# Patient Record
Sex: FEMALE | Race: WHITE | NOT HISPANIC OR LATINO | URBAN - METROPOLITAN AREA
[De-identification: names, ages, dates, MRNs, and addresses within clinical notes are randomized per-mention and may not be internally consistent; named-entity substitution may affect disease eponyms.]

---
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
## 2020-03-06 ENCOUNTER — EMERGENCY (EMERGENCY)
Facility: HOSPITAL | Age: 20
LOS: 1 days | Discharge: HOME | End: 2020-03-06
Attending: EMERGENCY MEDICINE | Admitting: EMERGENCY MEDICINE
Payer: COMMERCIAL

## 2020-03-06 VITALS
RESPIRATION RATE: 18 BRPM | SYSTOLIC BLOOD PRESSURE: 132 MMHG | OXYGEN SATURATION: 98 % | TEMPERATURE: 99 F | DIASTOLIC BLOOD PRESSURE: 83 MMHG | HEART RATE: 73 BPM

## 2020-03-06 VITALS
TEMPERATURE: 98 F | OXYGEN SATURATION: 100 % | HEART RATE: 90 BPM | RESPIRATION RATE: 14 BRPM | SYSTOLIC BLOOD PRESSURE: 139 MMHG | DIASTOLIC BLOOD PRESSURE: 82 MMHG | WEIGHT: 190.04 LBS

## 2020-03-06 LAB
ALBUMIN SERPL ELPH-MCNC: 3.9 G/DL — SIGNIFICANT CHANGE UP (ref 3.3–5)
ALP SERPL-CCNC: 98 U/L — SIGNIFICANT CHANGE UP (ref 40–120)
ALT FLD-CCNC: 38 U/L — SIGNIFICANT CHANGE UP (ref 12–78)
ANION GAP SERPL CALC-SCNC: 9 MMOL/L — SIGNIFICANT CHANGE UP (ref 5–17)
APPEARANCE UR: CLEAR — SIGNIFICANT CHANGE UP
AST SERPL-CCNC: 34 U/L — SIGNIFICANT CHANGE UP (ref 15–37)
BACTERIA # UR AUTO: ABNORMAL
BASOPHILS # BLD AUTO: 0.01 K/UL — SIGNIFICANT CHANGE UP (ref 0–0.2)
BASOPHILS NFR BLD AUTO: 0.2 % — SIGNIFICANT CHANGE UP (ref 0–2)
BILIRUB SERPL-MCNC: 0.5 MG/DL — SIGNIFICANT CHANGE UP (ref 0.2–1.2)
BILIRUB UR-MCNC: NEGATIVE — SIGNIFICANT CHANGE UP
BUN SERPL-MCNC: 7 MG/DL — SIGNIFICANT CHANGE UP (ref 7–23)
CALCIUM SERPL-MCNC: 9 MG/DL — SIGNIFICANT CHANGE UP (ref 8.5–10.1)
CHLORIDE SERPL-SCNC: 104 MMOL/L — SIGNIFICANT CHANGE UP (ref 96–108)
CO2 SERPL-SCNC: 25 MMOL/L — SIGNIFICANT CHANGE UP (ref 22–31)
COLOR SPEC: SIGNIFICANT CHANGE UP
CREAT SERPL-MCNC: 0.72 MG/DL — SIGNIFICANT CHANGE UP (ref 0.5–1.3)
DIFF PNL FLD: NEGATIVE — SIGNIFICANT CHANGE UP
EOSINOPHIL # BLD AUTO: 0.11 K/UL — SIGNIFICANT CHANGE UP (ref 0–0.5)
EOSINOPHIL NFR BLD AUTO: 1.8 % — SIGNIFICANT CHANGE UP (ref 0–6)
EPI CELLS # UR: SIGNIFICANT CHANGE UP
FLU A RESULT: SIGNIFICANT CHANGE UP
FLU A RESULT: SIGNIFICANT CHANGE UP
FLUAV AG NPH QL: SIGNIFICANT CHANGE UP
FLUBV AG NPH QL: DETECTED
GLUCOSE SERPL-MCNC: 92 MG/DL — SIGNIFICANT CHANGE UP (ref 70–99)
GLUCOSE UR QL: NEGATIVE — SIGNIFICANT CHANGE UP
HCG SERPL-ACNC: <1 MIU/ML — SIGNIFICANT CHANGE UP
HCT VFR BLD CALC: 41.7 % — SIGNIFICANT CHANGE UP (ref 34.5–45)
HGB BLD-MCNC: 14.5 G/DL — SIGNIFICANT CHANGE UP (ref 11.5–15.5)
IMM GRANULOCYTES NFR BLD AUTO: 0.2 % — SIGNIFICANT CHANGE UP (ref 0–1.5)
KETONES UR-MCNC: ABNORMAL
LACTATE SERPL-SCNC: 1.6 MMOL/L — SIGNIFICANT CHANGE UP (ref 0.7–2)
LEUKOCYTE ESTERASE UR-ACNC: ABNORMAL
LYMPHOCYTES # BLD AUTO: 1.53 K/UL — SIGNIFICANT CHANGE UP (ref 1–3.3)
LYMPHOCYTES # BLD AUTO: 25.2 % — SIGNIFICANT CHANGE UP (ref 13–44)
MCHC RBC-ENTMCNC: 28.5 PG — SIGNIFICANT CHANGE UP (ref 27–34)
MCHC RBC-ENTMCNC: 34.8 GM/DL — SIGNIFICANT CHANGE UP (ref 32–36)
MCV RBC AUTO: 82.1 FL — SIGNIFICANT CHANGE UP (ref 80–100)
MONOCYTES # BLD AUTO: 0.47 K/UL — SIGNIFICANT CHANGE UP (ref 0–0.9)
MONOCYTES NFR BLD AUTO: 7.7 % — SIGNIFICANT CHANGE UP (ref 2–14)
NEUTROPHILS # BLD AUTO: 3.95 K/UL — SIGNIFICANT CHANGE UP (ref 1.8–7.4)
NEUTROPHILS NFR BLD AUTO: 64.9 % — SIGNIFICANT CHANGE UP (ref 43–77)
NITRITE UR-MCNC: NEGATIVE — SIGNIFICANT CHANGE UP
NRBC # BLD: 0 /100 WBCS — SIGNIFICANT CHANGE UP (ref 0–0)
PH UR: 8 — SIGNIFICANT CHANGE UP (ref 5–8)
PLATELET # BLD AUTO: 283 K/UL — SIGNIFICANT CHANGE UP (ref 150–400)
POTASSIUM SERPL-MCNC: 3.2 MMOL/L — LOW (ref 3.5–5.3)
POTASSIUM SERPL-SCNC: 3.2 MMOL/L — LOW (ref 3.5–5.3)
PROT SERPL-MCNC: 8.1 G/DL — SIGNIFICANT CHANGE UP (ref 6–8.3)
PROT UR-MCNC: NEGATIVE — SIGNIFICANT CHANGE UP
RBC # BLD: 5.08 M/UL — SIGNIFICANT CHANGE UP (ref 3.8–5.2)
RBC # FLD: 13.5 % — SIGNIFICANT CHANGE UP (ref 10.3–14.5)
RBC CASTS # UR COMP ASSIST: SIGNIFICANT CHANGE UP /HPF (ref 0–4)
RSV RESULT: SIGNIFICANT CHANGE UP
RSV RNA RESP QL NAA+PROBE: SIGNIFICANT CHANGE UP
SODIUM SERPL-SCNC: 138 MMOL/L — SIGNIFICANT CHANGE UP (ref 135–145)
SP GR SPEC: 1.01 — SIGNIFICANT CHANGE UP (ref 1.01–1.02)
UROBILINOGEN FLD QL: NEGATIVE — SIGNIFICANT CHANGE UP
WBC # BLD: 6.08 K/UL — SIGNIFICANT CHANGE UP (ref 3.8–10.5)
WBC # FLD AUTO: 6.08 K/UL — SIGNIFICANT CHANGE UP (ref 3.8–10.5)
WBC UR QL: SIGNIFICANT CHANGE UP

## 2020-03-06 PROCEDURE — 36415 COLL VENOUS BLD VENIPUNCTURE: CPT

## 2020-03-06 PROCEDURE — 85027 COMPLETE CBC AUTOMATED: CPT

## 2020-03-06 PROCEDURE — 83605 ASSAY OF LACTIC ACID: CPT

## 2020-03-06 PROCEDURE — 96375 TX/PRO/DX INJ NEW DRUG ADDON: CPT

## 2020-03-06 PROCEDURE — 99284 EMERGENCY DEPT VISIT MOD MDM: CPT | Mod: 25

## 2020-03-06 PROCEDURE — 87631 RESP VIRUS 3-5 TARGETS: CPT

## 2020-03-06 PROCEDURE — 87040 BLOOD CULTURE FOR BACTERIA: CPT

## 2020-03-06 PROCEDURE — 87633 RESP VIRUS 12-25 TARGETS: CPT

## 2020-03-06 PROCEDURE — 96374 THER/PROPH/DIAG INJ IV PUSH: CPT

## 2020-03-06 PROCEDURE — 96361 HYDRATE IV INFUSION ADD-ON: CPT

## 2020-03-06 PROCEDURE — 87798 DETECT AGENT NOS DNA AMP: CPT

## 2020-03-06 PROCEDURE — 84702 CHORIONIC GONADOTROPIN TEST: CPT

## 2020-03-06 PROCEDURE — 81001 URINALYSIS AUTO W/SCOPE: CPT

## 2020-03-06 PROCEDURE — 87486 CHLMYD PNEUM DNA AMP PROBE: CPT

## 2020-03-06 PROCEDURE — 80053 COMPREHEN METABOLIC PANEL: CPT

## 2020-03-06 PROCEDURE — 87086 URINE CULTURE/COLONY COUNT: CPT

## 2020-03-06 PROCEDURE — 99284 EMERGENCY DEPT VISIT MOD MDM: CPT

## 2020-03-06 PROCEDURE — 87581 M.PNEUMON DNA AMP PROBE: CPT

## 2020-03-06 RX ORDER — ONDANSETRON 8 MG/1
4 TABLET, FILM COATED ORAL ONCE
Refills: 0 | Status: COMPLETED | OUTPATIENT
Start: 2020-03-06 | End: 2020-03-06

## 2020-03-06 RX ORDER — FAMOTIDINE 10 MG/ML
20 INJECTION INTRAVENOUS ONCE
Refills: 0 | Status: COMPLETED | OUTPATIENT
Start: 2020-03-06 | End: 2020-03-06

## 2020-03-06 RX ORDER — SODIUM CHLORIDE 9 MG/ML
1000 INJECTION INTRAMUSCULAR; INTRAVENOUS; SUBCUTANEOUS ONCE
Refills: 0 | Status: COMPLETED | OUTPATIENT
Start: 2020-03-06 | End: 2020-03-06

## 2020-03-06 RX ORDER — ALPRAZOLAM 0.25 MG
0.25 TABLET ORAL ONCE
Refills: 0 | Status: DISCONTINUED | OUTPATIENT
Start: 2020-03-06 | End: 2020-03-06

## 2020-03-06 RX ORDER — ONDANSETRON 8 MG/1
1 TABLET, FILM COATED ORAL
Qty: 15 | Refills: 0
Start: 2020-03-06 | End: 2020-03-10

## 2020-03-06 RX ORDER — FLUOXETINE HCL 10 MG
40 CAPSULE ORAL ONCE
Refills: 0 | Status: COMPLETED | OUTPATIENT
Start: 2020-03-06 | End: 2020-03-06

## 2020-03-06 RX ADMIN — SODIUM CHLORIDE 1000 MILLILITER(S): 9 INJECTION INTRAMUSCULAR; INTRAVENOUS; SUBCUTANEOUS at 19:20

## 2020-03-06 RX ADMIN — ONDANSETRON 4 MILLIGRAM(S): 8 TABLET, FILM COATED ORAL at 18:56

## 2020-03-06 RX ADMIN — SODIUM CHLORIDE 1000 MILLILITER(S): 9 INJECTION INTRAMUSCULAR; INTRAVENOUS; SUBCUTANEOUS at 18:56

## 2020-03-06 RX ADMIN — Medication 0.25 MILLIGRAM(S): at 18:56

## 2020-03-06 RX ADMIN — SODIUM CHLORIDE 1000 MILLILITER(S): 9 INJECTION INTRAMUSCULAR; INTRAVENOUS; SUBCUTANEOUS at 18:20

## 2020-03-06 RX ADMIN — SODIUM CHLORIDE 1000 MILLILITER(S): 9 INJECTION INTRAMUSCULAR; INTRAVENOUS; SUBCUTANEOUS at 19:56

## 2020-03-06 RX ADMIN — FAMOTIDINE 20 MILLIGRAM(S): 10 INJECTION INTRAVENOUS at 18:56

## 2020-03-06 RX ADMIN — Medication 40 MILLIGRAM(S): at 18:56

## 2020-03-06 NOTE — ED ADULT NURSE NOTE - OBJECTIVE STATEMENT
a/ox4 patient came to ED c.o flu like symptoms. Patient anxious, c.o of decreased PO intake x1 week, vomiting, and dehydration. Patient states she's been so dizzy she can't hold anything down. Patient VSS. On droplet precautions for r.o flu. Will cont to monitor.

## 2020-03-06 NOTE — ED ADULT NURSE NOTE - CHIEF COMPLAINT QUOTE
C/O fever. + flu A. patient's mother was in cruise 7 days ago to [facility]. Mother tested negative for flu.

## 2020-03-06 NOTE — ED PROVIDER NOTE - CLINICAL SUMMARY MEDICAL DECISION MAKING FREE TEXT BOX
flu like symptoms, f/u labs, iv fluids, anti-emetics, patient did not take her prozac, to be given in ER

## 2020-03-06 NOTE — ED ADULT TRIAGE NOTE - CHIEF COMPLAINT QUOTE
C/O fever. + flu A. patient's mother was in cruise 7 days ago to [facility]. Mother tested negative for flu.

## 2020-03-06 NOTE — ED PROVIDER NOTE - NSFOLLOWUPINSTRUCTIONS_ED_ALL_ED_FT
WHAT YOU NEED TO KNOW:    Influenza (the flu) is an infection caused by the influenza virus. The flu is easily spread when an infected person coughs, sneezes, or has close contact with others. You may be able to spread the flu to others for 1 week or longer after signs or symptoms appear.    DISCHARGE INSTRUCTIONS:    Call your local emergency number (911 in the [place]) if:     You have trouble breathing, and your lips look purple or blue.      You have a seizure.    Return to the emergency department if:     You are dizzy, or you are urinating less or not at all.       You have a headache with a stiff neck, and you feel tired or confused.      You have new pain or pressure in your chest.      Your symptoms, such as shortness of breath, vomiting, or diarrhea, get worse.       Your symptoms, such as fever and coughing, seem to get better, but then get worse.     Call your doctor if:     You have new muscle pain or weakness.      You have questions or concerns about your condition or care.    Medicines: You may need any of the following:     Acetaminophen decreases pain and fever. It is available without a doctor's order. Ask how much to take and how often to take it. Follow directions. Read the labels of all other medicines you are using to see if they also contain acetaminophen, or ask your doctor or pharmacist. Acetaminophen can cause liver damage if not taken correctly. Do not use more than 4 grams (4,000 milligrams) total of acetaminophen in one day.       NSAIDs, such as ibuprofen, help decrease swelling, pain, and fever. This medicine is available with or without a doctor's order. NSAIDs can cause stomach bleeding or kidney problems in certain people. If you take blood thinner medicine, always ask your healthcare provider if NSAIDs are safe for you. Always read the medicine label and follow directions.      Antivirals help fight a viral infection.      Take your medicine as directed. Contact your healthcare provider if you think your medicine is not helping or if you have side effects. Tell him or her if you are allergic to any medicine. Keep a list of the medicines, vitamins, and herbs you take. Include the amounts, and when and why you take them. Bring the list or the pill bottles to follow-up visits. Carry your medicine list with you in case of an emergency.    Rest as much as you can to help you recover.    Drink liquids as directed to help prevent dehydration. Ask how much liquid to drink each day and which liquids are best for you.    Prevent the spread of influenza:     Wash your hands often. Use soap and water. Wash your hands after you use the bathroom, change a child's diapers, or sneeze. Wash your hands before you prepare or eat food. Use gel hand cleanser that has 60% alcohol, when soap and water are not available. Do not touch your eyes, nose, or mouth unless you have washed your hands first.Handwashing           Cover your mouth when you sneeze or cough. Cough into a tissue or the bend of your arm. If you use a tissue, throw it away immediately and wash your hands.       Clean shared items with a germ-killing . Clean table surfaces, doorknobs, and light switches. Do not share towels, silverware, and dishes with people who are sick. Wash bed sheets, towels, silverware, and dishes with soap and water.       Wear a mask over your mouth and nose if you are sick. The face mask may help protect others from becoming infected with the flu. Wear the mask when in common areas of your home or if you seek care with a healthcare provider.       Stay away from others if you are sick. Stay at home until 24 hours after your fever and symptoms are gone.      Influenza vaccine helps prevent influenza (flu). Everyone 6 months or older should get a yearly influenza vaccine. Get the vaccine as soon as recommended each year, usually in September or October.    Follow up with your doctor as directed: Write down your questions so you remember to ask them during your visits.       © Copyright [employer] 2020       back to top                      © Copyright [employer] 2020

## 2020-03-06 NOTE — ED PROVIDER NOTE - PATIENT PORTAL LINK FT
You can access the FollowMyHealth Patient Portal offered by [facility] by registering at the following website: [email or web address]. By joining [employer] FollowMyHealth portal, you will also be able to view your health information using other applications (apps) compatible with our system.

## 2020-03-06 NOTE — ED ADULT NURSE REASSESSMENT NOTE - NS ED NURSE REASSESS COMMENT FT1
Received report from Kira ZAVALETA. Patient resting in bed. No complaints of pain/discomfort at this time. Safety and comfort measures provided and maintained. Patient informed to provide urine.

## 2020-03-06 NOTE — ED PROVIDER NOTE - OBJECTIVE STATEMENT
19 female presents to ER with mother states on Monday she was diagnosed with FLU A at urgent care, having fever tmax 101F, body aches, sore throat, congestion, dizziness, vomiting. Patient was given tamiflu and stopped taking it in 2 days because of increased nausea. Patient mother at the bedside states she had flu like symptoms that are now resolving and other 17 year old daughter also has flu like symptoms. Patient has not had recent travel, but mother states she went on a cruise to [facility], no contact or exposure to corona virus.

## 2020-03-07 LAB
CULTURE RESULTS: SIGNIFICANT CHANGE UP
FLUBV RNA SPEC QL NAA+PROBE: DETECTED
RAPID RVP RESULT: DETECTED
SPECIMEN SOURCE: SIGNIFICANT CHANGE UP

## 2020-03-11 LAB
CULTURE RESULTS: SIGNIFICANT CHANGE UP
CULTURE RESULTS: SIGNIFICANT CHANGE UP
SPECIMEN SOURCE: SIGNIFICANT CHANGE UP
SPECIMEN SOURCE: SIGNIFICANT CHANGE UP

## 2021-01-13 ENCOUNTER — TRANSCRIPTION ENCOUNTER (OUTPATIENT)
Age: 21
End: 2021-01-13

## 2022-05-25 NOTE — ED PROVIDER NOTE - PRO INTERPRETER NEED 2
----- Message from Cindy Sanders sent at 5/18/2022 12:16 PM CDT -----  Contact: [phone]  Type:  Needs Medical Advice    Who Called: Cielo from [employer]   Would the patient rather a call back or a response via [facility]? Call back  Best Call Back Number: [phone]  Additional Information: Calling to ask if the medical records release form was received. Please call to advice        English

## 2023-04-04 NOTE — ED PROVIDER NOTE - NS ED MD DISPO DISCHARGE CCDA
Outpatient Psychiatry Follow-Up Visit    Clinical Status of Patient: Outpatient (Ambulatory)  04/04/2023     Chief Complaint: 29 year old male presenting today for a follow-up.       Interval History and Content of Current Session:  Interim Events/Subjective Report/Content of Current Session:  follow-up appointment.    Pt is a 29 year old male with past psychiatric hx of PTSD who presents for follow-up treatment. Pt explained that he is depressed and anxious with poor sleep. Pt described his depression symptoms as follows: poor sleep, poor appetite, daytime fatigue, crying, intrusive thoughts of being a burden, SI by hx, poor hygiene, isolate self, feels powerless. Pt notes a history of hitting himself. Pt denied SI lately. Pt denied any seizures and denied alcohol, drug, and cigarette use. Pt is not exercising.     Past Psychiatric hx: inpt X 1 in 2017 and prior treatment with Abilify, Trintellix, Wellbutrin, Zoloft (robotic), Buspar (effective), Prazosin (effective)    Past Medical hx:   Past Medical History:   Diagnosis Date    Anxiety     Depression     Tobacco use     Tricuspid insufficiency         Interim hx:  Medication changes last visit:   started Prozac 20mg once daily  Anxiety: moderate - unchanged  Depression: moderate - unchanged     Denies suicidal/homicidal ideations.  Denies hopelessness/worthlessness.    Denies auditory/visual hallucinations      Alcohol: minimal, infrequent  Drug: pt denied  Caffeine: minimal use  Tobacco: pt denied      Review of Systems   PSYCHIATRIC: Pertinent items are noted in the narrative.        CONSTITUTIONAL: weight stable    Past Medical, Family and Social History: The patient's past medical, family and social history have been reviewed and updated as appropriate within the electronic medical record. See encounter notes.     Current Psychiatric Medication:  Prozac 20mg one daily     Compliance: yes      Side effects: odd dreams and somewhat tired     Risk  Parameters:  Patient reports no suicidal ideation  Patient reports no homicidal ideation  Patient reports no self-injurious behavior  Patient reports no violent behavior     Exam (detailed: at least 9 elements; comprehensive: all 15 elements)   Constitutional  Vitals:  Most recent vital signs, dated less than 90 days prior to this appointment, were reviewed. Pulse:  [59]   BP: (130)/(89)       General:  unremarkable, age appropriate, casual attire, good eye contact, good rapport       Musculoskeletal  Muscle Strength/Tone:  no flaccidity, no tremor    Gait & Station:  normal      Psychiatric                       Speech:  normal tone, normal rate, rhythm, and volume   Mood & Affect:   Depressed, anxious         Thought Process:   Goal directed; Linear    Associations:   intact   Thought Content:   No SI/HI, delusions, or paranoia, no AV/VH   Insight & Judgement:   Good, adequate to circumstances   Orientation:   grossly intact; alert and oriented x 4    Memory:  intact for content of interview    Language:  grossly intact, can repeat    Attention Span  : Grossly intact for content of interview   Fund of Knowledge:   intact and appropriate to age and level of education        Assessment and Diagnosis   Status/Progress: unchanged     Impression: Pt struggles with depression and anxiety symptoms in the context of complex trauma. Pt should continue in psychiatric medication management, start counseling, and start a cardio exercise regimen.     Diagnosis: PTSD    Intervention/Counseling/Treatment Plan   Medication Management:      1. Continue Prozac 20mg once daily     2. Pt is on the waitlist for psychotherapy     3. Start a cardio exercise regimen     4. Call to report any worsening of symptoms or problems with the medication. Pt instructed to go to ER with thoughts of harming self, others    Psychotherapy:   Target symptoms: depression, anxiety  Why chosen therapy is appropriate versus another modality: CBT used;  relevant to diagnosis, patient responds to this modality  Outcome monitoring methods: self-report, observation  Therapeutic intervention type: Cognitive Behavioral Therapy  Topics discussed/themes: building skills sets for symptom management, symptom recognition, nutrition, exercise  The patient's response to the intervention is fair  Patient's response to treatment is: good.   The patient's progress toward treatment goals: unchanged  16 minutes spent with pt in psychotherapy and 5 minutes in medication management     Return to clinic: 5 weeks    -Cognitive-Behavioral/Supportive therapy and psychoeducation provided  -R/B/SE's of medications discussed with the pt who expresses understanding and chooses to take medications as prescribed.   -Pt instructed to call clinic, 911 or go to nearest emergency room if sxs worsen or pt is in   crisis. The pt expresses understanding.    Jose A Beaver, PhD, MP      Patient/Caregiver provided printed discharge information.

## 2023-12-07 ENCOUNTER — APPOINTMENT (OUTPATIENT)
Dept: MRI IMAGING | Facility: CLINIC | Age: 23
End: 2023-12-07
Payer: COMMERCIAL

## 2023-12-07 ENCOUNTER — APPOINTMENT (OUTPATIENT)
Dept: ORTHOPEDIC SURGERY | Facility: CLINIC | Age: 23
End: 2023-12-07
Payer: COMMERCIAL

## 2023-12-07 VITALS — HEIGHT: 63 IN | WEIGHT: 200 LBS | BODY MASS INDEX: 35.44 KG/M2

## 2023-12-07 DIAGNOSIS — Z86.59 PERSONAL HISTORY OF OTHER MENTAL AND BEHAVIORAL DISORDERS: ICD-10-CM

## 2023-12-07 PROBLEM — Z00.00 ENCOUNTER FOR PREVENTIVE HEALTH EXAMINATION: Status: ACTIVE | Noted: 2023-12-07

## 2023-12-07 PROCEDURE — 72148 MRI LUMBAR SPINE W/O DYE: CPT

## 2023-12-07 PROCEDURE — 72050 X-RAY EXAM NECK SPINE 4/5VWS: CPT

## 2023-12-07 PROCEDURE — 99204 OFFICE O/P NEW MOD 45 MIN: CPT

## 2023-12-07 PROCEDURE — 73502 X-RAY EXAM HIP UNI 2-3 VIEWS: CPT

## 2023-12-07 PROCEDURE — 72100 X-RAY EXAM L-S SPINE 2/3 VWS: CPT

## 2023-12-07 PROCEDURE — 72141 MRI NECK SPINE W/O DYE: CPT

## 2023-12-07 RX ORDER — CYCLOBENZAPRINE HYDROCHLORIDE 5 MG/1
5 TABLET, FILM COATED ORAL 3 TIMES DAILY
Qty: 30 | Refills: 0 | Status: ACTIVE | COMMUNITY
Start: 2023-12-07 | End: 1900-01-01

## 2023-12-07 RX ORDER — FLUOXETINE HCL 10 MG
10 TABLET ORAL
Refills: 0 | Status: ACTIVE | COMMUNITY

## 2023-12-09 ENCOUNTER — APPOINTMENT (OUTPATIENT)
Dept: MRI IMAGING | Facility: CLINIC | Age: 23
End: 2023-12-09
Payer: COMMERCIAL

## 2023-12-09 PROCEDURE — 73721 MRI JNT OF LWR EXTRE W/O DYE: CPT | Mod: LT

## 2023-12-22 ENCOUNTER — APPOINTMENT (OUTPATIENT)
Dept: ORTHOPEDIC SURGERY | Facility: CLINIC | Age: 23
End: 2023-12-22
Payer: COMMERCIAL

## 2023-12-22 PROCEDURE — 99213 OFFICE O/P EST LOW 20 MIN: CPT

## 2023-12-22 NOTE — IMAGING
[de-identified] : Spine: Inspection/Palpation: No tenderness to palpation throughout Cervical/thoracic/lumbar spine. No bony stepoffs, No lesions.   Gait: Non-antalgic, able to perform bilateral toe and heel rise.   Neurologic: Bilateral upper extremities 5/5 Deltoid/Biceps/Triceps/ Wrist Flexion/Wrist Extension/ / Intrinsics Except Bilateral Lower Extremities 5/5 Iliopsoas/Quadriceps/Hamstrings/ Tibialis Anterior/ Gastrocnemius. Extensor Hallucis Longus/ Flexor Hallucis Longus except   Sensation intact to light touch C5-T1 Sensation intact to light touch L2-S1   Biceps/Triceps/Brachioradialis Reflex within normal limits Patellar/ Achilles reflex within normal limits.   Negative Christiansen's,  No inverted brachioradialis reflex   Negative straight leg raise    Pain with left hip er  X-ray Ap/Lateral/ of cervical spine were viewed and interpreted.  Normal alignment is maintained without any spondylolisthesis.   X-ray Ap/Lateral of lumbar spine were viewed and interpreted.  Normal alignment is maintained without any spondylolisthesis.  x-ray ap pelvsi latearl bilateral hips.  No fractures
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
## 2024-02-21 ENCOUNTER — APPOINTMENT (OUTPATIENT)
Dept: ORTHOPEDIC SURGERY | Facility: CLINIC | Age: 24
End: 2024-02-21
Payer: COMMERCIAL

## 2024-02-21 PROCEDURE — 99213 OFFICE O/P EST LOW 20 MIN: CPT
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
## 2024-02-21 NOTE — IMAGING
[de-identified] : Spine: Inspection/Palpation: No tenderness to palpation throughout Cervical/thoracic/lumbar spine. No bony stepoffs, No lesions.   Gait: Non-antalgic, able to perform bilateral toe and heel rise.   Neurologic: Bilateral upper extremities 5/5 Deltoid/Biceps/Triceps/ Wrist Flexion/Wrist Extension/ / Intrinsics Except Bilateral Lower Extremities 5/5 Iliopsoas/Quadriceps/Hamstrings/ Tibialis Anterior/ Gastrocnemius. Extensor Hallucis Longus/ Flexor Hallucis Longus except   Sensation intact to light touch C5-T1 Sensation intact to light touch L2-S1   Biceps/Triceps/Brachioradialis Reflex within normal limits Patellar/ Achilles reflex within normal limits.   Negative Christiansen's,  No inverted brachioradialis reflex   Negative straight leg raise    Pain with left hip er  X-ray Ap/Lateral/ of cervical spine were viewed and interpreted.  Normal alignment is maintained without any spondylolisthesis.   X-ray Ap/Lateral of lumbar spine were viewed and interpreted.  Normal alignment is maintained without any spondylolisthesis.  x-ray ap pelvsi latearl bilateral hips.  No fractures

## 2024-02-28 ENCOUNTER — RX RENEWAL (OUTPATIENT)
Age: 24
End: 2024-02-28

## 2024-02-28 RX ORDER — MELOXICAM 15 MG/1
15 TABLET ORAL DAILY
Qty: 28 | Refills: 1 | Status: ACTIVE | COMMUNITY
Start: 2023-12-07 | End: 1900-01-01

## 2024-04-17 ENCOUNTER — APPOINTMENT (OUTPATIENT)
Dept: ORTHOPEDIC SURGERY | Facility: CLINIC | Age: 24
End: 2024-04-17
Payer: COMMERCIAL

## 2024-04-17 VITALS — HEIGHT: 63 IN | BODY MASS INDEX: 35.44 KG/M2 | WEIGHT: 200 LBS

## 2024-04-17 DIAGNOSIS — M54.2 CERVICALGIA: ICD-10-CM

## 2024-04-17 DIAGNOSIS — M25.552 PAIN IN LEFT HIP: ICD-10-CM

## 2024-04-17 DIAGNOSIS — M54.50 LOW BACK PAIN, UNSPECIFIED: ICD-10-CM

## 2024-04-17 PROCEDURE — 99213 OFFICE O/P EST LOW 20 MIN: CPT
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
## 2024-04-17 NOTE — IMAGING
[de-identified] : Spine: Inspection/Palpation: No tenderness to palpation throughout Cervical/thoracic/lumbar spine. No bony stepoffs, No lesions.   Gait: Non-antalgic, able to perform bilateral toe and heel rise.   Neurologic: Bilateral upper extremities 5/5 Deltoid/Biceps/Triceps/ Wrist Flexion/Wrist Extension/ / Intrinsics Except Bilateral Lower Extremities 5/5 Iliopsoas/Quadriceps/Hamstrings/ Tibialis Anterior/ Gastrocnemius. Extensor Hallucis Longus/ Flexor Hallucis Longus except   Sensation intact to light touch C5-T1 Sensation intact to light touch L2-S1   Biceps/Triceps/Brachioradialis Reflex within normal limits Patellar/ Achilles reflex within normal limits.   Negative Christiansen's,  No inverted brachioradialis reflex   Negative straight leg raise    Pain with left hip er  X-ray Ap/Lateral/ of cervical spine were viewed and interpreted.  Normal alignment is maintained without any spondylolisthesis.   X-ray Ap/Lateral of lumbar spine were viewed and interpreted.  Normal alignment is maintained without any spondylolisthesis.  x-ray ap pelvsi latearl bilateral hips.  No fractures
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
## 2024-06-12 ENCOUNTER — APPOINTMENT (OUTPATIENT)
Dept: ORTHOPEDIC SURGERY | Facility: CLINIC | Age: 24
End: 2024-06-12

## 2024-10-26 ENCOUNTER — NON-APPOINTMENT (OUTPATIENT)
Age: 24
End: 2024-10-26

## 2024-12-19 ENCOUNTER — EMERGENCY (EMERGENCY)
Facility: HOSPITAL | Age: 24
LOS: 1 days | Discharge: HOME | End: 2024-12-19
Attending: STUDENT IN AN ORGANIZED HEALTH CARE EDUCATION/TRAINING PROGRAM | Admitting: EMERGENCY MEDICINE
Payer: COMMERCIAL

## 2024-12-19 VITALS
HEART RATE: 73 BPM | RESPIRATION RATE: 16 BRPM | TEMPERATURE: 99 F | OXYGEN SATURATION: 98 % | DIASTOLIC BLOOD PRESSURE: 79 MMHG | SYSTOLIC BLOOD PRESSURE: 140 MMHG

## 2024-12-19 VITALS
OXYGEN SATURATION: 98 % | RESPIRATION RATE: 18 BRPM | DIASTOLIC BLOOD PRESSURE: 89 MMHG | TEMPERATURE: 99 F | HEIGHT: 63 IN | HEART RATE: 103 BPM | SYSTOLIC BLOOD PRESSURE: 139 MMHG | WEIGHT: 199.96 LBS

## 2024-12-19 LAB
ALBUMIN SERPL ELPH-MCNC: 4 G/DL — SIGNIFICANT CHANGE UP (ref 3.3–5)
ALP SERPL-CCNC: 76 U/L — SIGNIFICANT CHANGE UP (ref 40–120)
ALT FLD-CCNC: 25 U/L — SIGNIFICANT CHANGE UP (ref 12–78)
ANION GAP SERPL CALC-SCNC: 9 MMOL/L — SIGNIFICANT CHANGE UP (ref 5–17)
APPEARANCE UR: CLEAR — SIGNIFICANT CHANGE UP
AST SERPL-CCNC: 13 U/L — LOW (ref 15–37)
BASOPHILS # BLD AUTO: 0.03 K/UL — SIGNIFICANT CHANGE UP (ref 0–0.2)
BASOPHILS NFR BLD AUTO: 0.3 % — SIGNIFICANT CHANGE UP (ref 0–2)
BILIRUB SERPL-MCNC: 0.3 MG/DL — SIGNIFICANT CHANGE UP (ref 0.2–1.2)
BILIRUB UR-MCNC: NEGATIVE — SIGNIFICANT CHANGE UP
BUN SERPL-MCNC: 6 MG/DL — LOW (ref 7–23)
CALCIUM SERPL-MCNC: 9.5 MG/DL — SIGNIFICANT CHANGE UP (ref 8.5–10.1)
CHLORIDE SERPL-SCNC: 104 MMOL/L — SIGNIFICANT CHANGE UP (ref 96–108)
CO2 SERPL-SCNC: 25 MMOL/L — SIGNIFICANT CHANGE UP (ref 22–31)
COLOR SPEC: YELLOW — SIGNIFICANT CHANGE UP
CREAT SERPL-MCNC: 0.79 MG/DL — SIGNIFICANT CHANGE UP (ref 0.5–1.3)
DIFF PNL FLD: NEGATIVE — SIGNIFICANT CHANGE UP
EGFR: 106 ML/MIN/1.73M2 — SIGNIFICANT CHANGE UP
EOSINOPHIL # BLD AUTO: 0.07 K/UL — SIGNIFICANT CHANGE UP (ref 0–0.5)
EOSINOPHIL NFR BLD AUTO: 0.7 % — SIGNIFICANT CHANGE UP (ref 0–6)
GLUCOSE SERPL-MCNC: 83 MG/DL — SIGNIFICANT CHANGE UP (ref 70–99)
GLUCOSE UR QL: NEGATIVE MG/DL — SIGNIFICANT CHANGE UP
HCG SERPL-ACNC: <1 MIU/ML — SIGNIFICANT CHANGE UP
HCT VFR BLD CALC: 42.2 % — SIGNIFICANT CHANGE UP (ref 34.5–45)
HGB BLD-MCNC: 14.6 G/DL — SIGNIFICANT CHANGE UP (ref 11.5–15.5)
IMM GRANULOCYTES NFR BLD AUTO: 0.2 % — SIGNIFICANT CHANGE UP (ref 0–0.9)
KETONES UR-MCNC: ABNORMAL MG/DL
LEUKOCYTE ESTERASE UR-ACNC: NEGATIVE — SIGNIFICANT CHANGE UP
LIDOCAIN IGE QN: 15 U/L — SIGNIFICANT CHANGE UP (ref 13–75)
LYMPHOCYTES # BLD AUTO: 2.98 K/UL — SIGNIFICANT CHANGE UP (ref 1–3.3)
LYMPHOCYTES # BLD AUTO: 30.8 % — SIGNIFICANT CHANGE UP (ref 13–44)
MCHC RBC-ENTMCNC: 29.6 PG — SIGNIFICANT CHANGE UP (ref 27–34)
MCHC RBC-ENTMCNC: 34.6 G/DL — SIGNIFICANT CHANGE UP (ref 32–36)
MCV RBC AUTO: 85.6 FL — SIGNIFICANT CHANGE UP (ref 80–100)
MONOCYTES # BLD AUTO: 0.49 K/UL — SIGNIFICANT CHANGE UP (ref 0–0.9)
MONOCYTES NFR BLD AUTO: 5.1 % — SIGNIFICANT CHANGE UP (ref 2–14)
NEUTROPHILS # BLD AUTO: 6.1 K/UL — SIGNIFICANT CHANGE UP (ref 1.8–7.4)
NEUTROPHILS NFR BLD AUTO: 62.9 % — SIGNIFICANT CHANGE UP (ref 43–77)
NITRITE UR-MCNC: NEGATIVE — SIGNIFICANT CHANGE UP
NRBC # BLD: 0 /100 WBCS — SIGNIFICANT CHANGE UP (ref 0–0)
PH UR: 7 — SIGNIFICANT CHANGE UP (ref 5–8)
PLATELET # BLD AUTO: 348 K/UL — SIGNIFICANT CHANGE UP (ref 150–400)
POTASSIUM SERPL-MCNC: 3.4 MMOL/L — LOW (ref 3.5–5.3)
POTASSIUM SERPL-SCNC: 3.4 MMOL/L — LOW (ref 3.5–5.3)
PROT SERPL-MCNC: 7.9 G/DL — SIGNIFICANT CHANGE UP (ref 6–8.3)
PROT UR-MCNC: NEGATIVE MG/DL — SIGNIFICANT CHANGE UP
RBC # BLD: 4.93 M/UL — SIGNIFICANT CHANGE UP (ref 3.8–5.2)
RBC # FLD: 12.4 % — SIGNIFICANT CHANGE UP (ref 10.3–14.5)
SODIUM SERPL-SCNC: 138 MMOL/L — SIGNIFICANT CHANGE UP (ref 135–145)
SP GR SPEC: 1 — LOW (ref 1–1.03)
UROBILINOGEN FLD QL: 0.2 MG/DL — SIGNIFICANT CHANGE UP (ref 0.2–1)
WBC # BLD: 9.69 K/UL — SIGNIFICANT CHANGE UP (ref 3.8–10.5)
WBC # FLD AUTO: 9.69 K/UL — SIGNIFICANT CHANGE UP (ref 3.8–10.5)

## 2024-12-19 PROCEDURE — 36415 COLL VENOUS BLD VENIPUNCTURE: CPT

## 2024-12-19 PROCEDURE — 96374 THER/PROPH/DIAG INJ IV PUSH: CPT | Mod: XU

## 2024-12-19 PROCEDURE — 84702 CHORIONIC GONADOTROPIN TEST: CPT

## 2024-12-19 PROCEDURE — 74177 CT ABD & PELVIS W/CONTRAST: CPT | Mod: MC

## 2024-12-19 PROCEDURE — 83690 ASSAY OF LIPASE: CPT

## 2024-12-19 PROCEDURE — 96375 TX/PRO/DX INJ NEW DRUG ADDON: CPT

## 2024-12-19 PROCEDURE — 85025 COMPLETE CBC W/AUTO DIFF WBC: CPT

## 2024-12-19 PROCEDURE — 74177 CT ABD & PELVIS W/CONTRAST: CPT | Mod: 26,MC

## 2024-12-19 PROCEDURE — 99284 EMERGENCY DEPT VISIT MOD MDM: CPT | Mod: 25

## 2024-12-19 PROCEDURE — 80053 COMPREHEN METABOLIC PANEL: CPT

## 2024-12-19 PROCEDURE — 81003 URINALYSIS AUTO W/O SCOPE: CPT

## 2024-12-19 PROCEDURE — 99285 EMERGENCY DEPT VISIT HI MDM: CPT

## 2024-12-19 RX ORDER — ONDANSETRON HYDROCHLORIDE 4 MG/1
4 TABLET, FILM COATED ORAL ONCE
Refills: 0 | Status: COMPLETED | OUTPATIENT
Start: 2024-12-19 | End: 2024-12-19

## 2024-12-19 RX ORDER — 0.9 % SODIUM CHLORIDE 0.9 %
2000 INTRAVENOUS SOLUTION INTRAVENOUS ONCE
Refills: 0 | Status: COMPLETED | OUTPATIENT
Start: 2024-12-19 | End: 2024-12-19

## 2024-12-19 RX ORDER — ONDANSETRON HYDROCHLORIDE 4 MG/1
1 TABLET, FILM COATED ORAL
Qty: 15 | Refills: 0
Start: 2024-12-19 | End: 2024-12-23

## 2024-12-19 RX ADMIN — Medication 2000 MILLILITER(S): at 16:30

## 2024-12-19 RX ADMIN — Medication 4 MILLIGRAM(S): at 16:30

## 2024-12-19 RX ADMIN — ONDANSETRON HYDROCHLORIDE 4 MILLIGRAM(S): 4 TABLET, FILM COATED ORAL at 16:30

## 2024-12-19 NOTE — ED PROVIDER NOTE - PROGRESS NOTE DETAILS
Received patient in signout pending CT results.  Labs noted no elevated white count CMP normal urine trace ketones seen CT imaging reveals no acute intra-abdominal pathology.  This is reviewed with the patient who reports she is still having some lower abdominal pain.  Pelvic ultrasound was offered patient declined states she has had ovarian cyst in the past and it is not that.  Does endorse she has been under increased stress as had some constipation wonders if that could be the result of this.  Will send p.o. Zofran advised to follow-up with her GI for further workup and evaluation.  Copy of results were provided offered flu COVID testing declining at this time. Tolerating po stable for discharge. Mother at bedside.

## 2024-12-19 NOTE — ED PROVIDER NOTE - PHYSICAL EXAMINATION
Vital signs as available reviewed.  General:  No acute distress.  Head:  Normocephalic, atraumatic.  Eyes:  Conjunctiva pink, no icterus.  Cardiovascular:  tachycardia, no obvious murmur.  Respiratory:  Clear to auscultation, good air entry bilaterally.  Abdomen:  Soft, diffuse tenderness to palpation worse LLQ.  Musculoskeletal:  No obvious deformity.  Neurologic: Alert and oriented, moving all extremities.  Skin:  Warm and dry.

## 2024-12-19 NOTE — ED ADULT NURSE NOTE - OBJECTIVE STATEMENT
Pt received in [facility] 24y female AXO 4 is ambulatory from home c/o abd pain. Pt states she has a PMH of colitis in the past and pt states abd pain started x3days. Pt states she has been under a lot of stress recently and believes it caused her stomach to "flare up". Upon assessment pt c/o abd pain. Pt denies fever, chills, nausea, vomiting, diarrhea and constipation. Right 20 AC placed, labs drawn, meds given as prescribed. pending results.

## 2024-12-19 NOTE — ED PROVIDER NOTE - CLINICAL SUMMARY MEDICAL DECISION MAKING FREE TEXT BOX
here with nausea, vomiting, abdominal pain. differential diagnosis inclusive of diverticulitis, colitis, uti, pyelonephritis. check labs, UA, treat symptoms, ct, re-eval.

## 2024-12-19 NOTE — ED PROVIDER NOTE - PATIENT PORTAL LINK FT
You can access the FollowMyHealth Patient Portal offered by [facility] by registering at the following website: [email or web address]. By joining [facility]’s FollowMyHealth portal, you will also be able to view your health information using other applications (apps) compatible with our system.

## 2024-12-19 NOTE — ED ADULT NURSE NOTE - NSFALLHARMRISKINTERV_ED_ALL_ED

## 2024-12-19 NOTE — ED PROVIDER NOTE - NSFOLLOWUPINSTRUCTIONS_ED_ALL_ED_FT
Return to the ED for any new or worsening symptoms  Take your medication as prescribed  Zofran per label instructions as needed for nausea  Clear liquids advance as tolerated  Consider over-the-counter MiraLAX to aid in constipation  Do not strain to have a bowel movement  Follow-up with your gastroenterologist call tomorrow to schedule follow-up      Acute Nausea and Vomiting    WHAT YOU NEED TO KNOW:    What does acute mean? Acute means the nausea and vomiting starts suddenly, gets worse quickly, and lasts a short time.    What are some common causes of acute nausea and vomiting?    Food poisoning    Large amounts of alcohol    Certain medicines, too much of any medicine, or stopping a regular medicine too quickly    Early stages of pregnancy    Infection in the stomach, intestines, or other organs    Trauma to the head    Anxiety or stress    Gastroparesis (a condition that prevents your stomach from emptying properly)    Metabolic disorders, such as uremia or adrenal insufficiency  What causes acute nausea and vomiting with other signs and symptoms? You may have stomach pain or problems with digestion. You may be sweating, have pale skin, and more saliva than usual. These signs and symptoms may be caused by the following:    Problems with your heart rate, blood flow to your heart muscle, blood pressure, or stomach fluid    Increased pressure or bleeding in the brain    Swelling of the tissue covering the brain    Migraine or seizures    Inner ear disorders that cause problems with balance    Inflammation of the appendix, gallbladder, stomach, pancreas, kidneys, or other organs    Bacteria or a parasite in the digestive system    Heart attack    Stomach ulcers, or bowel blockage or twisting  How is the cause of acute nausea and vomiting diagnosed? Your healthcare provider will examine you and ask about your symptoms. Tell him or her if the vomiting was before, during, or after a meal. Your provider may ask what medicines you take, including over-the-counter medicines. You may need blood tests to check for infection or inflammation.    How is acute nausea and vomiting treated? Vomiting may go away on its own. The goal of treatment is to prevent dehydration. Treatment also depends on the cause of the nausea and vomiting. Any medical condition causing your nausea and vomiting will also be treated. You may need one or more of the following:    Medicines may be given to calm your stomach and stop your vomiting. You may also need medicines to help empty your stomach and bowels.    IV fluids may be given to replace lost fluids and electrolytes. This may be needed it you cannot drink liquids.    A nasogastric (NG) tube may be needed if your nausea and vomiting is severe. The NG tube is put into your nose and moved down your throat until it reaches your stomach. Liquid, nutrition, or medicine may be given through an NG tube. The tube may instead be attached to suction if healthcare providers need to keep your stomach empty.  What can I do to manage acute nausea and vomiting?    Rest as much as you can. Too much activity can make your nausea worse.    Drink more liquids to prevent dehydration. Take small sips. Try drinks such as ginger ale, lemonade, water, or tea. Your provider may recommend that you drink an oral rehydration solution (ORS). ORS contains water, salts, and sugar that are needed to replace the lost body fluids.    Eat smaller meals, more often. Try bland foods and avoid spices or strong flavors    Do not drink alcohol. Alcohol may upset or irritate your stomach.  Call your local emergency number (911 in the [place]) if:    You have chest pain.    You have severe pain or cramping in your abdomen.    Your vision is blurred.    You are confused, have a high fever, or a stiff neck.    You have bright red blood coming from your rectum.    Your vomit smells like bowel movement.  When should I seek immediate care?    You have a severe headache or pain.    You are dizzy, cold, and thirsty, and your eyes and mouth are dry.    You are urinating very little or not at all.    You are dizzy or lightheaded when you stand up.    You see blood or material that looks like coffee grounds in your vomit.  When should I call my doctor?    You continue to vomit for more than 48 hours.    Your nausea and vomiting does not get better or go away after you use medicine.    You have questions or concerns about your condition or care.  CARE AGREEMENT:    You have the right to help plan your care. Learn about your health condition and how it may be treated. Discuss treatment options with your healthcare providers to decide what care you want to receive. You always have the right to refuse treatment.

## 2024-12-19 NOTE — ED PROVIDER NOTE - ADDITIONAL HISTORY OBTAINED FROM [facility]
Ok to go to CT w/o lab results per Dr. Maggie Lu.  CT updated      Mustapha Pineda RN  11/18/20 [id number] Family

## 2024-12-19 NOTE — ED PROVIDER NOTE - NSICDXPASTMEDICALHX_GEN_ALL_CORE_FT
PAST MEDICAL HISTORY:  Colitis     GERD (gastroesophageal reflux disease)     No pertinent past medical history     No pertinent past medical history

## 2024-12-19 NOTE — ED PROVIDER NOTE - OBJECTIVE STATEMENT
24-year-old female history of GERD, colitis here with mom complaining of abdominal pain.  Pain started bilateral lower abdomen worse on the left with radiation of the back for the past few days.  Patient unable to tolerate p.o.  As patient has associated nausea and vomiting.  Patient reports constipation, no diarrhea.  Had episode of colitis in the past since followed up with GI Dr. Cristobal.  Was temporarily put on mesalamine at that time, patient tried taking mesalamine and ibuprofen yesterday without relief of symptoms.  No fevers or chills, no chest pain or shortness of breath.

## 2025-07-01 ENCOUNTER — NON-APPOINTMENT (OUTPATIENT)
Age: 25
End: 2025-07-01